# Patient Record
Sex: MALE | Race: WHITE | NOT HISPANIC OR LATINO | Employment: UNEMPLOYED | URBAN - METROPOLITAN AREA
[De-identification: names, ages, dates, MRNs, and addresses within clinical notes are randomized per-mention and may not be internally consistent; named-entity substitution may affect disease eponyms.]

---

## 2017-03-17 ENCOUNTER — OFFICE VISIT (OUTPATIENT)
Dept: GENETICS | Facility: CLINIC | Age: 22
End: 2017-03-17
Payer: COMMERCIAL

## 2017-03-17 ENCOUNTER — LAB VISIT (OUTPATIENT)
Dept: LAB | Facility: HOSPITAL | Age: 22
End: 2017-03-17
Attending: MEDICAL GENETICS
Payer: COMMERCIAL

## 2017-03-17 VITALS — WEIGHT: 176.56 LBS | BODY MASS INDEX: 27.71 KG/M2 | HEIGHT: 67 IN

## 2017-03-17 DIAGNOSIS — Z81.8 FAMILY HISTORY OF AUTISM IN SIBLING: ICD-10-CM

## 2017-03-17 DIAGNOSIS — R53.83 FATIGUE, UNSPECIFIED TYPE: ICD-10-CM

## 2017-03-17 DIAGNOSIS — M62.81 MUSCLE WEAKNESS: ICD-10-CM

## 2017-03-17 DIAGNOSIS — R53.83 FATIGUE, UNSPECIFIED TYPE: Primary | ICD-10-CM

## 2017-03-17 DIAGNOSIS — F41.1 GENERALIZED ANXIETY DISORDER: ICD-10-CM

## 2017-03-17 LAB
ALBUMIN SERPL BCP-MCNC: 4.2 G/DL
ALP SERPL-CCNC: 93 U/L
ALT SERPL W/O P-5'-P-CCNC: 29 U/L
ANION GAP SERPL CALC-SCNC: 10 MMOL/L
AST SERPL-CCNC: 18 U/L
BASOPHILS # BLD AUTO: 0.02 K/UL
BASOPHILS NFR BLD: 0.3 %
BILIRUB SERPL-MCNC: 0.6 MG/DL
BUN SERPL-MCNC: 15 MG/DL
CALCIUM SERPL-MCNC: 9.6 MG/DL
CHLORIDE SERPL-SCNC: 110 MMOL/L
CHOLEST/HDLC SERPL: 4.1 {RATIO}
CK SERPL-CCNC: 68 U/L
CO2 SERPL-SCNC: 21 MMOL/L
CREAT SERPL-MCNC: 1 MG/DL
CRP SERPL-MCNC: 2.2 MG/L
DIFFERENTIAL METHOD: NORMAL
EOSINOPHIL # BLD AUTO: 0.1 K/UL
EOSINOPHIL NFR BLD: 2.1 %
ERYTHROCYTE [DISTWIDTH] IN BLOOD BY AUTOMATED COUNT: 12.7 %
EST. GFR  (AFRICAN AMERICAN): >60 ML/MIN/1.73 M^2
EST. GFR  (NON AFRICAN AMERICAN): >60 ML/MIN/1.73 M^2
FERRITIN SERPL-MCNC: 165 NG/ML
GLUCOSE SERPL-MCNC: 80 MG/DL
HCT VFR BLD AUTO: 40.9 %
HDL/CHOLESTEROL RATIO: 24.1 %
HDLC SERPL-MCNC: 170 MG/DL
HDLC SERPL-MCNC: 41 MG/DL
HGB BLD-MCNC: 14.5 G/DL
IRON SERPL-MCNC: 74 UG/DL
LDLC SERPL CALC-MCNC: 106.6 MG/DL
LYMPHOCYTES # BLD AUTO: 2.9 K/UL
LYMPHOCYTES NFR BLD: 43.2 %
MCH RBC QN AUTO: 30.9 PG
MCHC RBC AUTO-ENTMCNC: 35.5 %
MCV RBC AUTO: 87 FL
MONOCYTES # BLD AUTO: 0.5 K/UL
MONOCYTES NFR BLD: 7 %
NEUTROPHILS # BLD AUTO: 3.1 K/UL
NEUTROPHILS NFR BLD: 46.5 %
NONHDLC SERPL-MCNC: 129 MG/DL
PLATELET # BLD AUTO: 285 K/UL
PMV BLD AUTO: 9.4 FL
POTASSIUM SERPL-SCNC: 3.9 MMOL/L
PROT SERPL-MCNC: 7.6 G/DL
RBC # BLD AUTO: 4.7 M/UL
SATURATED IRON: 19 %
SODIUM SERPL-SCNC: 141 MMOL/L
T4 FREE SERPL-MCNC: 1.05 NG/DL
TOTAL IRON BINDING CAPACITY: 386 UG/DL
TRANSFERRIN SERPL-MCNC: 261 MG/DL
TRIGL SERPL-MCNC: 112 MG/DL
TSH SERPL DL<=0.005 MIU/L-ACNC: 1.23 UIU/ML
WBC # BLD AUTO: 6.69 K/UL

## 2017-03-17 PROCEDURE — 86140 C-REACTIVE PROTEIN: CPT

## 2017-03-17 PROCEDURE — 30000890 MAYO MISCELLANEOUS TEST (REFLEX)

## 2017-03-17 PROCEDURE — 36415 COLL VENOUS BLD VENIPUNCTURE: CPT | Mod: PO

## 2017-03-17 PROCEDURE — 83918 ORGANIC ACIDS TOTAL QUANT: CPT

## 2017-03-17 PROCEDURE — 84210 ASSAY OF PYRUVATE: CPT

## 2017-03-17 PROCEDURE — 80053 COMPREHEN METABOLIC PANEL: CPT

## 2017-03-17 PROCEDURE — 99999 PR PBB SHADOW E&M-NEW PATIENT-LVL II: CPT | Mod: PBBFAC,,, | Performed by: MEDICAL GENETICS

## 2017-03-17 PROCEDURE — 86235 NUCLEAR ANTIGEN ANTIBODY: CPT

## 2017-03-17 PROCEDURE — 82550 ASSAY OF CK (CPK): CPT

## 2017-03-17 PROCEDURE — 85025 COMPLETE CBC W/AUTO DIFF WBC: CPT

## 2017-03-17 PROCEDURE — 84443 ASSAY THYROID STIM HORMONE: CPT

## 2017-03-17 PROCEDURE — 83605 ASSAY OF LACTIC ACID: CPT

## 2017-03-17 PROCEDURE — 82728 ASSAY OF FERRITIN: CPT

## 2017-03-17 PROCEDURE — 99205 OFFICE O/P NEW HI 60 MIN: CPT | Mod: S$GLB,,, | Performed by: MEDICAL GENETICS

## 2017-03-17 PROCEDURE — 84466 ASSAY OF TRANSFERRIN: CPT

## 2017-03-17 PROCEDURE — 83540 ASSAY OF IRON: CPT

## 2017-03-17 PROCEDURE — 80061 LIPID PANEL: CPT

## 2017-03-17 PROCEDURE — 86038 ANTINUCLEAR ANTIBODIES: CPT

## 2017-03-17 PROCEDURE — 84439 ASSAY OF FREE THYROXINE: CPT

## 2017-03-17 PROCEDURE — 83520 IMMUNOASSAY QUANT NOS NONAB: CPT

## 2017-03-17 RX ORDER — ACETYLCYSTEINE 600 MG
600 CAPSULE ORAL 2 TIMES DAILY
COMMUNITY

## 2017-03-17 RX ORDER — IBUPROFEN 100 MG/5ML
1000 SUSPENSION, ORAL (FINAL DOSE FORM) ORAL DAILY
COMMUNITY

## 2017-03-17 RX ORDER — LEUCOVORIN CALCIUM 25 MG/1
25 TABLET ORAL 2 TIMES DAILY
Qty: 60 TABLET | Refills: 11 | Status: SHIPPED | OUTPATIENT
Start: 2017-03-17 | End: 2018-03-22 | Stop reason: SDUPTHER

## 2017-03-17 RX ORDER — LISDEXAMFETAMINE DIMESYLATE 70 MG/1
CAPSULE ORAL
COMMUNITY
Start: 2017-03-03

## 2017-03-17 RX ORDER — ALPRAZOLAM 1 MG/1
TABLET, EXTENDED RELEASE ORAL
COMMUNITY
Start: 2017-03-05

## 2017-03-17 RX ORDER — MIRTAZAPINE 30 MG/1
TABLET, FILM COATED ORAL
COMMUNITY
Start: 2017-03-03

## 2017-03-17 RX ORDER — ZOLPIDEM TARTRATE 10 MG/1
10 TABLET ORAL NIGHTLY PRN
COMMUNITY

## 2017-03-17 RX ORDER — CLOMIPRAMINE HYDROCHLORIDE 75 MG/1
CAPSULE ORAL
COMMUNITY
Start: 2017-03-06

## 2017-03-17 NOTE — MR AVS SNAPSHOT
"    [facility]  [address]  [facility] [address]  Phone: [phone]                  Cristopher Mcclain   3/17/2017 3:00 PM   Office Visit    Description:  Male : 1995   Provider:  Zach Colón MD   Department:  [facility]                To Do List           Future Appointments        Provider Department Dept Phone    3/17/2017 3:00 PM Zach Colón MD [facility] [phone]      Goals (5 Years of Data)     None      [facility]     [facility] -  Assistance  Registered nurses in the [facility] provide clinical advisement, health education, appointment booking, and other advisory services.  Call for this free service at [phone].             Medications           Message regarding Medications     Verify the changes and/or additions to your medication regime listed below are the same as discussed with your clinician today.  If any of these changes or additions are incorrect, please notify your healthcare provider.             Verify that the below list of medications is an accurate representation of the medications you are currently taking.  If none reported, the list may be blank. If incorrect, please contact your healthcare provider. Carry this list with you in case of emergency.           Current Medications     acetylcysteine (NAC) 600 mg Cap Take 600 mg by mouth 2 (two) times daily.    alprazolam (XANAX XR) 1 MG Tb24     ascorbic acid, vitamin C, (VITAMIN C) 1000 MG tablet Take 1,000 mg by mouth once daily.    clomiPRAMINE (ANAFRANIL) 75 MG capsule     mirtazapine (REMERON) 30 MG tablet     VYVANSE 70 mg capsule     zolpidem (AMBIEN) 10 mg Tab Take 10 mg by mouth nightly as needed.           Clinical Reference Information           Your Vitals Were     Height Weight BMI          5' 6.8" (1.697 m) 80.1 kg (176 lb 9.4 oz) 27.82 kg/m2        Allergies as of 3/17/2017     Rocephin [Ceftriaxone]    "   Immunizations Administered on Date of Encounter - 3/17/2017     None      [facility] Sign-Up     Activating your [facility] account is as easy as 1-2-3!     1) Visit [email or web address], select Sign Up Now, enter this activation code and your date of birth, then select Next.  [email or web address]  Expires: 5/1/2017 12:48 PM      2) Create a username and password to use when you visit [facility] in the future and select a security question in case you lose your password and select Next.    3) Enter your e-mail address and click Sign Up!    Additional Information  If you have questions, please e-mail [email or web address] or call [phone] to talk to our [facility] staff. Remember, [facility] is NOT to be used for urgent needs. For medical emergencies, dial 911.         Language Assistance Services     ATTENTION: Language assistance services are available, free of charge. Please call [phone].      ATENCIÓN: Si habla español, tiene a gómez disposición servicios gratuitos de asistencia lingüística. Llame al [phone].     LENORA Ý: N?u b?n nói Ti?ng Vi?t, có các d?ch v? h? tr? ngôn ng? mi?n phí dành cho b?n. G?i s? [phone].         [facility] complies with applicable Federal civil rights laws and does not discriminate on the basis of race, color, national origin, age, disability, or sex.

## 2017-03-18 PROBLEM — Z81.8 FAMILY HISTORY OF AUTISM IN SIBLING: Status: ACTIVE | Noted: 2017-03-18

## 2017-03-18 PROBLEM — F41.9 ANXIETY DISORDER: Status: ACTIVE | Noted: 2017-03-18

## 2017-03-18 PROBLEM — M62.81 MUSCLE WEAKNESS: Status: ACTIVE | Noted: 2017-03-18

## 2017-03-18 NOTE — PROGRESS NOTES
Cristopher Goldsmith  DOS 3/17/17   [birth year]  MRN [id number]    PRESENT ILLNESS: Cristopher is a 21 year  male who presents to clinic today for evaluation with his mother, father, and sister. He is the oldest child in his family and his parents are uncertain as to what is causing his issues. He has had a lifetime of insomnia since prior to . He developed ADHD and tried supplements but he started medications for ADHD at 18 years old. He has depression, anxiety, and extreme nail biting. His hands and feet are always cold. He feels weak on occasion and lays in bed quite a bit due to fatigue. As a younger child, he had sensory issues with touch and sound. He also had a rare type of echolalia where he would quietly repeat the last word a person would say - it resolved after 1 - 1 ½ years. He has never been diagnosed with autism and met all of his developmental milestones. He has never had any genetic testing. He has no history of therapies such as PT/OT/ST. He has been evaluated by allergy / immunology and his mother feels as though he has issues with pollen. As a younger child, if he would shower in the morning, he would almost faint and then have to either vomit or have a bowel movement. He has been diagnosed with probable Celiac disease. He does his best to be gluten free. He has has issues with having to race to the bathroom after meals and would need to take Immodium every morning from 12 years old to 21 years old. He has a problem digesting fatty foods as it causes stool issues. He will even take enzymes with his meals but it does not seem to help.     Cristopher presents to our medical genetics service for a genetic evaluation.    MEDICATIONS:      acetylcysteine (NAC) 600 mg Cap    alprazolam (XANAX XR) 1 MG Tb24     ascorbic acid, vitamin C, (VITAMIN C) 1000 MG tablet    clomiPRAMINE (ANAFRANIL) 75 MG capsule     leucovorin (WELLCOVORIN) 25 MG Tab    mirtazapine (REMERON) 30 MG tablet      phosphatidylse-omega-3-dha-epa (VAYACOG) 100-19.5-6.5 mg Cap     UNABLE TO FIND     UNABLE TO FIND    VYVANSE 70 mg capsule    zolpidem (AMBIEN) 10 mg Tab    ALLERGIES: Rocephin (reaction - rash), Gluten.     DEVELOPMENTAL HISTORY: normal milestones.     FAMILY HISTORY: Cristopher has a 6-year-old sister who has LDs but not nearly as severe. His other 11-year-old sister has normal development and learning. His 34-year-old father had LDs and required special ed but did graduate from regular high school and was not as severe as Cristopher. Mom is 31 and had LDs. Maternal aunt has bipolar. Parental consanguinity was denied.     PHYSICAL EXAM: Weight 80.1 kg, height 169 cm, BMI 27.  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 Her was speaking in full sentences but kept a poor eye contact and had somewhat flat affect. He answered questions appropriately.  He did not appear autistic.     IMPRESSION: At this time, I have discussed that I could not appreciate any well recognizable genetic syndrome in Cristopher. His main issue is psychiatric in nature in my opinion. Genetic testing would likely have low yield based on the fact that her cognitive skills and language are normal and he does not have any dysmorphic facial features.     Psychiatric disorders (such as anxiety and depression) certainly have genetic component and candidate genes but each confers low risk individually. Testing for any individual gene would have a low yield. Eventually, testing may be readily available in a multi-gene panel. For the most part, psychiatric disorders are not caused by single common genetic variants and epigenetic factors are also likely at work. I did recommend psychotherapy. Due to fatigue, I did prescribe methylcobalamin for more energy.    RECOMMENDATIONS:   1. Genetic testing likely low yield (family decided not to proceed).   2. Psychotherapy.  3. Methylcobalamin 2 mg SC weekly.  4. Follow up prn.    TIME  SPENT: 60 minutes, more than 50% in counseling. The note is in epic.    Zach Colón M.D.  Section Head - Medical Genetics   [facility]

## 2017-03-20 LAB
ANA SER QL IF: NORMAL
LACTATE SERPL-SCNC: 1 MMOL/L
MITOCHONDRIA AB TITR SER IF: NORMAL {TITER}

## 2017-03-22 LAB — PYRUVATE BLD-SCNC: 0.06 MMOL/L (ref 0.03–0.11)

## 2017-03-23 LAB
ACYLCARNITINE SERPL-SCNC: 4 UMOL/L (ref 5–29)
CARNITINE FREE SERPL-SCNC: 25 UMOL/L (ref 25–60)
CARNITINE SERPL-SCNC: 0.2 UMOL/L (ref 0.1–1)
CARNITINE SERPL-SCNC: 29 UMOL/L (ref 34–86)

## 2017-03-27 ENCOUNTER — PATIENT MESSAGE (OUTPATIENT)
Dept: GENETICS | Facility: CLINIC | Age: 22
End: 2017-03-27

## 2017-03-28 LAB — MAYO MISCELLANEOUS RESULT (REF): NORMAL

## 2017-03-30 RX ORDER — LEVOCARNITINE 330 MG/1
660 TABLET ORAL 2 TIMES DAILY
Qty: 120 TABLET | Refills: 6 | Status: SHIPPED | OUTPATIENT
Start: 2017-03-30 | End: 2018-05-09 | Stop reason: SDUPTHER

## 2017-04-04 LAB
2OXO3ME-VALERATE SERPL-SCNC: 22 UMOL/L (ref 10–30)
2OXOISOVALERATE SERPL-SCNC: 15 UMOL/L (ref 3–20)
2OXOISOVALERATE SERPL-SCNC: 32 UMOL/L (ref 20–75)
ACETOACET SERPL-SCNC: 2 UMOL/L (ref 0–66)
B-OH-BUTYR SERPL-SCNC: 28 UMOL/L (ref 0–30)
CITRATE SERPL-SCNC: 58 UMOL/L (ref 0–100)
LACTATE SERPL-SCNC: 1732 UMOL/L (ref 600–2600)
ORGANIC ACIDS PATTERN SERPL-IMP: NORMAL
PYRUVATE SERPL-SCNC: 102 UMOL/L (ref 20–140)
SUCCINATE SERPL-SCNC: 12 UMOL/L (ref 16–25)

## 2017-05-08 ENCOUNTER — PATIENT MESSAGE (OUTPATIENT)
Dept: GENETICS | Facility: CLINIC | Age: 22
End: 2017-05-08

## 2017-09-04 ENCOUNTER — PATIENT MESSAGE (OUTPATIENT)
Dept: GENETICS | Facility: CLINIC | Age: 22
End: 2017-09-04

## 2018-03-22 RX ORDER — LEUCOVORIN CALCIUM 25 MG/1
TABLET ORAL
Qty: 54 TABLET | Refills: 13 | Status: SHIPPED | OUTPATIENT
Start: 2018-03-22 | End: 2019-02-18 | Stop reason: SDUPTHER

## 2018-05-09 RX ORDER — LEVOCARNITINE 330 MG/1
TABLET ORAL
Qty: 120 TABLET | Refills: 6 | Status: SHIPPED | OUTPATIENT
Start: 2018-05-09 | End: 2019-03-12 | Stop reason: SDUPTHER

## 2019-02-18 RX ORDER — LEUCOVORIN CALCIUM 25 MG/1
TABLET ORAL
Qty: 90 TABLET | Refills: 8 | Status: SHIPPED | OUTPATIENT
Start: 2019-02-18 | End: 2020-02-19

## 2019-03-12 RX ORDER — LEVOCARNITINE 330 MG/1
TABLET ORAL
Qty: 120 TABLET | Refills: 6 | Status: SHIPPED | OUTPATIENT
Start: 2019-03-12

## 2019-06-05 ENCOUNTER — TELEPHONE (OUTPATIENT)
Dept: GENETICS | Facility: CLINIC | Age: 24
End: 2019-06-05

## 2019-06-05 NOTE — TELEPHONE ENCOUNTER
----- Message from Zach Colón MD sent at 6/5/2019  1:26 PM CDT -----  Contact: [phone]  no  ----- Message -----  From: Love Vigil MA  Sent: 6/5/2019   9:36 AM  To: Zach Colón MD    Received incoming call from pharmacy.   They would like to know if brand name is needed for his levOCARNitine RX?

## 2019-06-28 ENCOUNTER — PATIENT MESSAGE (OUTPATIENT)
Dept: GENETICS | Facility: CLINIC | Age: 24
End: 2019-06-28

## 2019-07-24 ENCOUNTER — PATIENT MESSAGE (OUTPATIENT)
Dept: GENETICS | Facility: CLINIC | Age: 24
End: 2019-07-24

## 2019-12-20 ENCOUNTER — TELEPHONE (OUTPATIENT)
Dept: GENETICS | Facility: CLINIC | Age: 24
End: 2019-12-20

## 2019-12-20 NOTE — TELEPHONE ENCOUNTER
Spoke to pt mom, pt mom is currently in ER and apologized for canceling. Advised pt mom I would speak with  about getting them over pt spring break. Pt mom verbalized understanding.

## 2020-02-19 RX ORDER — LEUCOVORIN CALCIUM 25 MG/1
TABLET ORAL
Qty: 100 TABLET | Refills: 6 | Status: SHIPPED | OUTPATIENT
Start: 2020-02-19 | End: 2020-04-04 | Stop reason: SDUPTHER

## 2020-04-04 RX ORDER — LEUCOVORIN CALCIUM 25 MG/1
TABLET ORAL
Qty: 60 TABLET | Refills: 0 | Status: SHIPPED | OUTPATIENT
Start: 2020-04-04

## 2020-06-05 ENCOUNTER — TELEPHONE (OUTPATIENT)
Dept: GENETICS | Facility: CLINIC | Age: 25
End: 2020-06-05

## 2020-06-05 NOTE — TELEPHONE ENCOUNTER
LVM informing mom to contact the office to reschedule pt's appt due to incoming severe weather on Monday